# Patient Record
Sex: MALE | Employment: FULL TIME | URBAN - METROPOLITAN AREA
[De-identification: names, ages, dates, MRNs, and addresses within clinical notes are randomized per-mention and may not be internally consistent; named-entity substitution may affect disease eponyms.]

---

## 2024-10-08 ENCOUNTER — CONSULT (OUTPATIENT)
Dept: GASTROENTEROLOGY | Facility: CLINIC | Age: 39
End: 2024-10-08

## 2024-10-08 VITALS
WEIGHT: 220 LBS | HEIGHT: 66 IN | SYSTOLIC BLOOD PRESSURE: 122 MMHG | DIASTOLIC BLOOD PRESSURE: 82 MMHG | BODY MASS INDEX: 35.36 KG/M2

## 2024-10-08 DIAGNOSIS — K62.89 RECTAL LUMP: Primary | ICD-10-CM

## 2024-10-08 PROCEDURE — 99203 OFFICE O/P NEW LOW 30 MIN: CPT | Performed by: INTERNAL MEDICINE

## 2024-10-08 NOTE — PROGRESS NOTES
[facility] - Outpatient Consultation  Kwesi Garrett 39 y.o. male MRN: [id number]  Encounter: [id number]    ASSESSMENT AND PLAN:      1. Rectal lump  With the benefit of his history and physical exam today it feels as though this is a sebaceous cyst subdermally.  Probably best felt through the rectum given its close proximity.  There is nothing to suggest a fissure and really does not feel like a hemorrhoid or any type of hernia.  No obvious prolapse.  I recommend sitz bath's warm water baths which I explained to him through the  and would like to reevaluate him in 6 weeks at which point I would consider a colonoscopy for completeness sake.  I asked him to report any new symptoms      Followup Appointment: 6 weeks  ______________________________________________________________________    Chief Complaint   Patient presents with    Lump in the rectal area and pain       HPI:   Kwesi Garrett is a 39 y.o. year old male who presents with 1 to 2 months of noticing a lump in the rectal area. he speaks [place] we used the online . he says it seems to be on the right side of his rectum he can sometimes feel it with his hand there is some discomfort with it.  No severe pain no change in bowel habits he still goes twice a day is normal for him.  No abdominal pain no weight loss.  No rectal bleeding no straining to go.  He thinks he first noticed it when he was lifting something heavy.    Historical Information   History reviewed. No pertinent past medical history.  Past Surgical History:   Procedure Laterality Date    FOOT SURGERY       Social History     Substance and Sexual Activity   Alcohol Use Yes    Comment: occasionally     Social History     Substance and Sexual Activity   Drug Use Not on file     Social History     Tobacco Use   Smoking Status Some Days    Types: Cigarettes   Smokeless Tobacco Never     Family History   Problem Relation Age of Onset    Colon  "polyps Neg Hx     Colon cancer Neg Hx        Meds/Allergies   No current outpatient medications on file.    No Known Allergies    PHYSICAL EXAM:    Blood pressure 122/82, height 5' 6\" (1.676 m), weight 99.8 kg (220 lb). Body mass index is 35.51 kg/m².  General Appearance: NAD, cooperative, alert  Eyes: Anicteric, conjunctiva pink   ENT:  Normocephalic, atraumatic, normal mucosa.    Neck:  Supple, symmetrical, trachea midline,   Resp:  Clear to auscultation bilaterally; no rales, rhonchi or wheezing; respirations unlabored   CV:  S1 S2, Regular rate and rhythm; no murmur, rub, or gallop.  GI:  Soft, non-tender, non-distended; normal bowel sounds; no masses, no organomegaly   Rectal: Performed easily there is no tenderness no palpable hemorrhoid or visible external lesion.  Nothing in the internal exam.  The patient show me where he feels it during the rectal exam it appears to be in the right lateral part of the wall of the anus.  I am able to move this during the rectal exam without severe discomfort.  There is a very subtle indentation.  Musculoskeletal: No cyanosis, clubbing or edema. Normal ROM.  Skin:  No jaundice, rashes, or lesions   Heme/Lymph: No palpable cervical lymphadenopathy  Psych: Normal affect, good eye contact  Neuro: No gross deficits, AAOx3    Lab Results:   No results found for: \"WBC\", \"HGB\", \"HCT\", \"MCV\", \"PLT\"  No results found for: \"NA\", \"K\", \"CL\", \"CO2\", \"ANIONGAP\", \"BUN\", \"CREATININE\", \"GLUCOSE\", \"GLUF\", \"CALCIUM\", \"CORRECTEDCA\", \"AST\", \"ALT\", \"ALKPHOS\", \"PROT\", \"BILITOT\", \"EGFR\"      Radiology Results:   No results found.      REVIEW OF SYSTEMS:    CONSTITUTIONAL: Denies any fever, chills, rigors, and weight loss.  HEENT: No earache or tinnitus. Denies hearing loss or visual disturbances.  CARDIOVASCULAR: No chest pain or palpitations.   RESPIRATORY: Denies any cough, hemoptysis, shortness of breath or dyspnea on exertion.  GASTROINTESTINAL: As noted in the History of Present Illness. "   GENITOURINARY: No problems with urination. Denies any hematuria or dysuria.  NEUROLOGIC: No dizziness or vertigo, denies headaches.   MUSCULOSKELETAL: Denies any muscle or joint pain.   SKIN: Denies skin rashes or itching.   ENDOCRINE: Denies excessive thirst. Denies intolerance to heat or cold.  PSYCHOSOCIAL: Denies depression or anxiety. Denies any recent memory loss.

## 2024-10-11 ENCOUNTER — TELEPHONE (OUTPATIENT)
Age: 39
End: 2024-10-11

## 2024-10-11 NOTE — TELEPHONE ENCOUNTER
Patients GI provider:  Dr. Day     Number to return call: [phone]    Reason for call: Pt calling asking for further explanation Of CT scan. She states the doctor told her she was fine but she thinks there are findings that were not explained to her.    Please call to discuss with     Scheduled procedure/appointment date if applicable: 10/8/24     
Spoke with patient using ([id number]) Pt requesting for Dr to review his CT scan done in ER.   
Spoke with pt using ([id number]) reviewed provider note regarding CT scan  I did review the CAT scan again as I did during the office visit.  It is essentially negative that shows colon diverticuli which are very common there is no inflammation.  There is scar tissue at the base of the right lung that is the only findings on CAT scan.  Can you convey this to the patient?  Thanks, B     
show

## 2024-11-12 ENCOUNTER — TELEPHONE (OUTPATIENT)
Dept: GASTROENTEROLOGY | Facility: CLINIC | Age: 39
End: 2024-11-12

## 2024-11-12 NOTE — TELEPHONE ENCOUNTER
11/12/24 - Called the patient with a [place] interperter # [id number], there was no voice mail set up. Due to change in the provider schedule we need to reschedule this appotiment. I was able to send a MY CHART message to the patient